# Patient Record
Sex: FEMALE | Race: BLACK OR AFRICAN AMERICAN | NOT HISPANIC OR LATINO | Employment: FULL TIME | URBAN - METROPOLITAN AREA
[De-identification: names, ages, dates, MRNs, and addresses within clinical notes are randomized per-mention and may not be internally consistent; named-entity substitution may affect disease eponyms.]

---

## 2019-09-23 PROBLEM — M25.569 KNEE PAIN: Status: ACTIVE | Noted: 2018-03-30

## 2019-09-23 PROBLEM — A74.9 CHLAMYDIA INFECTION: Status: ACTIVE | Noted: 2017-08-18

## 2020-11-23 ENCOUNTER — SOCIAL WORK (OUTPATIENT)
Dept: ADMINISTRATIVE | Facility: OTHER | Age: 25
End: 2020-11-23

## 2020-11-23 NOTE — PROGRESS NOTES
KARLI faxed and scanned pt's notification of pregnancy into epic. No other needs identified at this time.    Tiffani Estrada,MSW  Pager#[phone]

## 2020-12-03 PROBLEM — A74.9 CHLAMYDIA INFECTION: Status: RESOLVED | Noted: 2017-08-18 | Resolved: 2020-12-03

## 2021-01-02 PROBLEM — Z3A.18 18 WEEKS GESTATION OF PREGNANCY: Status: ACTIVE | Noted: 2021-01-02
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
## 2021-03-22 PROBLEM — Z3A.27 27 WEEKS GESTATION OF PREGNANCY: Status: ACTIVE | Noted: 2021-01-02

## 2021-04-12 PROBLEM — E89.0 H/O PARTIAL THYROIDECTOMY: Status: ACTIVE | Noted: 2021-04-12

## 2021-04-12 PROBLEM — Z3A.31 31 WEEKS GESTATION OF PREGNANCY: Status: ACTIVE | Noted: 2021-01-02

## 2021-04-12 PROBLEM — O09.93 SUPERVISION OF HIGH RISK PREGNANCY IN THIRD TRIMESTER: Status: ACTIVE | Noted: 2021-04-12

## 2021-04-26 ENCOUNTER — SOCIAL WORK (OUTPATIENT)
Dept: ADMINISTRATIVE | Facility: OTHER | Age: 26
End: 2021-04-26

## 2021-04-26 PROBLEM — Z3A.33 33 WEEKS GESTATION OF PREGNANCY: Status: ACTIVE | Noted: 2021-01-02

## 2021-05-10 PROBLEM — Z3A.35 35 WEEKS GESTATION OF PREGNANCY: Status: ACTIVE | Noted: 2021-01-02

## 2021-05-10 PROBLEM — D69.6 BENIGN GESTATIONAL THROMBOCYTOPENIA IN THIRD TRIMESTER: Status: ACTIVE | Noted: 2021-05-10

## 2021-05-10 PROBLEM — O99.113 BENIGN GESTATIONAL THROMBOCYTOPENIA IN THIRD TRIMESTER: Status: ACTIVE | Noted: 2021-05-10

## 2021-05-17 PROBLEM — Z3A.36 36 WEEKS GESTATION OF PREGNANCY: Status: ACTIVE | Noted: 2021-01-02

## 2021-05-24 PROBLEM — Z3A.37 37 WEEKS GESTATION OF PREGNANCY: Status: ACTIVE | Noted: 2021-01-02

## 2021-05-24 PROBLEM — O26.893 DYSURIA DURING PREGNANCY IN THIRD TRIMESTER: Status: ACTIVE | Noted: 2021-05-24

## 2021-05-24 PROBLEM — O99.019 ANEMIA DURING PREGNANCY: Status: ACTIVE | Noted: 2021-05-24

## 2021-05-24 PROBLEM — R30.0 DYSURIA DURING PREGNANCY IN THIRD TRIMESTER: Status: ACTIVE | Noted: 2021-05-24

## 2021-08-02 DIAGNOSIS — U07.1 COVID-19 VIRUS DETECTED: ICD-10-CM

## 2021-08-02 PROBLEM — O09.93 SUPERVISION OF HIGH RISK PREGNANCY IN THIRD TRIMESTER: Status: RESOLVED | Noted: 2021-04-12 | Resolved: 2021-08-02

## 2021-08-03 PROBLEM — U07.1 COVID-19: Status: ACTIVE | Noted: 2021-08-03

## 2022-05-02 PROBLEM — Z3A.37 37 WEEKS GESTATION OF PREGNANCY: Status: RESOLVED | Noted: 2021-01-02 | Resolved: 2022-05-02

## 2024-06-28 PROBLEM — D69.6 BENIGN GESTATIONAL THROMBOCYTOPENIA IN THIRD TRIMESTER: Status: RESOLVED | Noted: 2021-05-10 | Resolved: 2024-06-28

## 2024-06-28 PROBLEM — O99.113 BENIGN GESTATIONAL THROMBOCYTOPENIA IN THIRD TRIMESTER: Status: RESOLVED | Noted: 2021-05-10 | Resolved: 2024-06-28

## 2024-06-28 PROBLEM — O99.019 ANEMIA DURING PREGNANCY: Status: RESOLVED | Noted: 2021-05-24 | Resolved: 2024-06-28

## 2024-06-28 PROBLEM — O26.893 DYSURIA DURING PREGNANCY IN THIRD TRIMESTER: Status: RESOLVED | Noted: 2021-05-24 | Resolved: 2024-06-28

## 2024-06-28 PROBLEM — R30.0 DYSURIA DURING PREGNANCY IN THIRD TRIMESTER: Status: RESOLVED | Noted: 2021-05-24 | Resolved: 2024-06-28

## 2024-06-28 LAB — PAP RECOMMENDATION EXT: NORMAL

## 2024-09-24 ENCOUNTER — PATIENT OUTREACH (OUTPATIENT)
Dept: ADMINISTRATIVE | Facility: HOSPITAL | Age: 29
End: 2024-09-24